# Patient Record
Sex: MALE | Race: WHITE | URBAN - NONMETROPOLITAN AREA
[De-identification: names, ages, dates, MRNs, and addresses within clinical notes are randomized per-mention and may not be internally consistent; named-entity substitution may affect disease eponyms.]

---

## 2023-04-14 ENCOUNTER — LAB OUTSIDE AN ENCOUNTER (OUTPATIENT)
Dept: URBAN - NONMETROPOLITAN AREA CLINIC 13 | Facility: CLINIC | Age: 23
End: 2023-04-14

## 2023-04-14 ENCOUNTER — OFFICE VISIT (OUTPATIENT)
Dept: URBAN - NONMETROPOLITAN AREA CLINIC 13 | Facility: CLINIC | Age: 23
End: 2023-04-14
Payer: COMMERCIAL

## 2023-04-14 ENCOUNTER — WEB ENCOUNTER (OUTPATIENT)
Dept: URBAN - NONMETROPOLITAN AREA CLINIC 13 | Facility: CLINIC | Age: 23
End: 2023-04-14

## 2023-04-14 VITALS
HEIGHT: 69 IN | WEIGHT: 153.6 LBS | HEART RATE: 70 BPM | BODY MASS INDEX: 22.75 KG/M2 | SYSTOLIC BLOOD PRESSURE: 116 MMHG | DIASTOLIC BLOOD PRESSURE: 78 MMHG

## 2023-04-14 DIAGNOSIS — R19.4 CHANGE IN STOOL HABITS: ICD-10-CM

## 2023-04-14 DIAGNOSIS — R10.32 LLQ ABDOMINAL PAIN: ICD-10-CM

## 2023-04-14 PROCEDURE — 99244 OFF/OP CNSLTJ NEW/EST MOD 40: CPT | Performed by: INTERNAL MEDICINE

## 2023-04-14 PROCEDURE — 99204 OFFICE O/P NEW MOD 45 MIN: CPT | Performed by: INTERNAL MEDICINE

## 2023-04-14 RX ORDER — POLYETHYLENE GLYCOL 3350, SODIUM SULFATE, SODIUM CHLORIDE, POTASSIUM CHLORIDE, ASCORBIC ACID, SODIUM ASCORBATE 140-9-5.2G
1000 ML KIT ORAL ONCE
Qty: 1 KIT | Refills: 0 | OUTPATIENT
Start: 2023-04-14 | End: 2023-04-15

## 2023-04-14 NOTE — HPI-TODAY'S VISIT:
4/14/2023  Travis Presents for evaluation of abdominal pain referred by Dr. Peter Dickson. A copy of this note and recommendations will be forwarded to his office.  He denies any history of colon cancer in his family. He was evaluated in the emergency department in [place] for abdominal pain with non concerning CT of abdomen pelvis with contrast showing only constipation, and he was discharged on dicyclomine.  He states he is only taken 3 doses of this.  Reports some moderate improvement. He continues with pain in his abdomen with no pointed alleviating or exacerbating factors. He has a diet of pork, steak, rice, deli sandwiches.  He denies any tobacco use.  He continues with intake of regular beer, vodka, whiskey every few weeks. He reports normal bowel movements at least once or twice per day. He reports a change in his bowel movements approximately a month ago he started having irregular bowel movements skipping days with skinnier caliber stool. Over Easter he did have intake of a heavier meal including ham, corn, potatoes, bread and mac & cheese. He denies any blood in his stool.  He denies any weight loss in the last year. SP

## 2023-04-14 NOTE — PHYSICAL EXAM HENT:
Head , normocephalic , atraumatic
Pt lives with spouse and brother in a private home with 4 GIULIANA 1 handrail + approx 10 steps 1 handrail to bed&bath. Pt's PLOF was independent in all ADL's + ambulation without an Assistive Device and was driving PTA. Pt has shower chair, SAC and RW DME at home.

## 2023-04-26 ENCOUNTER — TELEPHONE ENCOUNTER (OUTPATIENT)
Dept: URBAN - METROPOLITAN AREA CLINIC 35 | Facility: CLINIC | Age: 23
End: 2023-04-26

## 2023-07-19 ENCOUNTER — OFFICE VISIT (OUTPATIENT)
Dept: URBAN - NONMETROPOLITAN AREA SURGERY CENTER 1 | Facility: SURGERY CENTER | Age: 23
End: 2023-07-19
Payer: COMMERCIAL

## 2023-07-19 ENCOUNTER — CLAIMS CREATED FROM THE CLAIM WINDOW (OUTPATIENT)
Dept: URBAN - METROPOLITAN AREA CLINIC 4 | Facility: CLINIC | Age: 23
End: 2023-07-19
Payer: COMMERCIAL

## 2023-07-19 DIAGNOSIS — R10.32 ABDOMINAL CRAMPING IN LEFT LOWER QUADRANT: ICD-10-CM

## 2023-07-19 DIAGNOSIS — D12.4 BENIGN NEOPLASM OF DESCENDING COLON: ICD-10-CM

## 2023-07-19 DIAGNOSIS — D12.4 ADENOMA OF DESCENDING COLON: ICD-10-CM

## 2023-07-19 PROCEDURE — G8907 PT DOC NO EVENTS ON DISCHARG: HCPCS | Performed by: INTERNAL MEDICINE

## 2023-07-19 PROCEDURE — 88305 TISSUE EXAM BY PATHOLOGIST: CPT | Performed by: PATHOLOGY

## 2023-07-19 PROCEDURE — 45385 COLONOSCOPY W/LESION REMOVAL: CPT | Performed by: INTERNAL MEDICINE

## 2023-08-25 ENCOUNTER — OFFICE VISIT (OUTPATIENT)
Dept: URBAN - NONMETROPOLITAN AREA CLINIC 13 | Facility: CLINIC | Age: 23
End: 2023-08-25
Payer: COMMERCIAL

## 2023-08-25 ENCOUNTER — DASHBOARD ENCOUNTERS (OUTPATIENT)
Age: 23
End: 2023-08-25

## 2023-08-25 VITALS
DIASTOLIC BLOOD PRESSURE: 73 MMHG | HEIGHT: 69 IN | BODY MASS INDEX: 24.32 KG/M2 | WEIGHT: 164.2 LBS | HEART RATE: 93 BPM | SYSTOLIC BLOOD PRESSURE: 112 MMHG

## 2023-08-25 DIAGNOSIS — R10.32 LLQ ABDOMINAL PAIN: ICD-10-CM

## 2023-08-25 DIAGNOSIS — R19.4 CHANGE IN STOOL HABITS: ICD-10-CM

## 2023-08-25 DIAGNOSIS — Z12.11 SCREENING FOR COLON CANCER: ICD-10-CM

## 2023-08-25 PROBLEM — 14760008: Status: ACTIVE | Noted: 2023-04-14

## 2023-08-25 PROBLEM — 301716002: Status: ACTIVE | Noted: 2023-04-14

## 2023-08-25 PROBLEM — 305058001: Status: ACTIVE | Noted: 2023-08-25

## 2023-08-25 PROBLEM — 88111009: Status: ACTIVE | Noted: 2023-04-14

## 2023-08-25 PROCEDURE — 99212 OFFICE O/P EST SF 10 MIN: CPT

## 2023-08-25 NOTE — HPI-TODAY'S VISIT:
4/14/2023  Travis Presents for evaluation of abdominal pain referred by Dr. Peter Dickson. A copy of this note and recommendations will be forwarded to his office.  He denies any history of colon cancer in his family. He was evaluated in the emergency department in [place] for abdominal pain with non concerning CT of abdomen pelvis with contrast showing only constipation, and he was discharged on dicyclomine.  He states he is only taken 3 doses of this.  Reports some moderate improvement. He continues with pain in his abdomen with no pointed alleviating or exacerbating factors. He has a diet of pork, steak, rice, deli sandwiches.  He denies any tobacco use.  He continues with intake of regular beer, vodka, whiskey every few weeks. He reports normal bowel movements at least once or twice per day. He reports a change in his bowel movements approximately a month ago he started having irregular bowel movements skipping days with skinnier caliber stool. Over Easter he did have intake of a heavier meal including ham, corn, potatoes, bread and mac & cheese. He denies any blood in his stool.  He denies any weight loss in the last year. SP  8/25/2023 Travis returns after colonoscopy with removal of polyps resulting TA on path. He reports his bowel habits have returnt to his baseline. He has had no further abdominal pain. He is due for repeat colonoscopy in 5 yrs. SP